# Patient Record
Sex: MALE | Race: WHITE
[De-identification: names, ages, dates, MRNs, and addresses within clinical notes are randomized per-mention and may not be internally consistent; named-entity substitution may affect disease eponyms.]

---

## 2020-04-01 ENCOUNTER — HOSPITAL ENCOUNTER (EMERGENCY)
Dept: HOSPITAL 26 - MED | Age: 35
Discharge: HOME | End: 2020-04-01
Payer: COMMERCIAL

## 2020-04-01 VITALS
SYSTOLIC BLOOD PRESSURE: 149 MMHG | DIASTOLIC BLOOD PRESSURE: 100 MMHG | BODY MASS INDEX: 27.31 KG/M2 | HEIGHT: 64 IN | WEIGHT: 160 LBS

## 2020-04-01 VITALS — SYSTOLIC BLOOD PRESSURE: 149 MMHG | DIASTOLIC BLOOD PRESSURE: 100 MMHG

## 2020-04-01 DIAGNOSIS — K21.9: ICD-10-CM

## 2020-04-01 DIAGNOSIS — Z20.828: ICD-10-CM

## 2020-04-01 DIAGNOSIS — J45.909: ICD-10-CM

## 2020-04-01 DIAGNOSIS — B34.9: Primary | ICD-10-CM

## 2020-04-01 DIAGNOSIS — Z79.899: ICD-10-CM

## 2020-04-01 PROCEDURE — 71045 X-RAY EXAM CHEST 1 VIEW: CPT

## 2020-04-01 PROCEDURE — 87804 INFLUENZA ASSAY W/OPTIC: CPT

## 2020-04-01 PROCEDURE — 99284 EMERGENCY DEPT VISIT MOD MDM: CPT

## 2020-04-01 PROCEDURE — 36415 COLL VENOUS BLD VENIPUNCTURE: CPT

## 2020-06-11 ENCOUNTER — HOSPITAL ENCOUNTER (EMERGENCY)
Dept: HOSPITAL 26 - MED | Age: 35
Discharge: HOME | End: 2020-06-11
Payer: COMMERCIAL

## 2020-06-11 VITALS — DIASTOLIC BLOOD PRESSURE: 88 MMHG | SYSTOLIC BLOOD PRESSURE: 138 MMHG

## 2020-06-11 VITALS — BODY MASS INDEX: 32.98 KG/M2 | WEIGHT: 168 LBS | HEIGHT: 60 IN

## 2020-06-11 VITALS — SYSTOLIC BLOOD PRESSURE: 146 MMHG | DIASTOLIC BLOOD PRESSURE: 96 MMHG

## 2020-06-11 DIAGNOSIS — R09.89: ICD-10-CM

## 2020-06-11 DIAGNOSIS — K21.9: ICD-10-CM

## 2020-06-11 DIAGNOSIS — Z20.828: ICD-10-CM

## 2020-06-11 DIAGNOSIS — R05: ICD-10-CM

## 2020-06-11 DIAGNOSIS — J45.909: ICD-10-CM

## 2020-06-11 DIAGNOSIS — Z79.899: ICD-10-CM

## 2020-06-11 DIAGNOSIS — R50.9: Primary | ICD-10-CM

## 2020-06-11 PROCEDURE — 99283 EMERGENCY DEPT VISIT LOW MDM: CPT

## 2020-06-11 NOTE — NUR
C/O SUBJECTIVE FEVER, COUGH, RINORRHEA, LOSS OF TASTE X 2 DAYS. TOOK DAY QUIL & 
NIGHT QUIL. TEMP 99.9, O2 SAT 97% AT THIS TIME.

LUNGS CLEAR BILTERALLY. RR EVEN AND UNLABORED.  IN TRIAGE.

PT ALERT AND AWAKE. PT AMBULATORY WITH STEADY GAIT.

PT STATES NO KNOWN COVID TESTING.

MED HX: ASTHMA, GERD

## 2020-06-11 NOTE — NUR
Patient discharged with v/s stable. Written and verbal after care instructions 
given and explained. 

Patient alert, oriented and verbalized understanding of instructions. 
Ambulatory with steady gait. All questions addressed prior to discharge. ID 
band removed. Patient advised to follow up with PMD. Rx of IBUPROFEN, 
ACETAMINOPHEN, AND PSEUDOEPHEDRINE HYDROCHLORIDE given. Patient educated on 
indication of medication including possible reaction and side effects. 
Opportunity to ask questions provided and answered.

PT INSTRUCTED THAT HE WILL GET A CALL IN UP TO 48 HOURS FOR COVID RESULT IF IT 
RETURNS POSITIVE

PT INSTRUCTED ON HAND HYGIENE AND SOCIAL DISTANCING

## 2023-07-15 ENCOUNTER — HOSPITAL ENCOUNTER (EMERGENCY)
Dept: HOSPITAL 26 - MED | Age: 38
Discharge: HOME | End: 2023-07-15
Payer: COMMERCIAL

## 2023-07-15 VITALS
SYSTOLIC BLOOD PRESSURE: 142 MMHG | HEART RATE: 90 BPM | TEMPERATURE: 98 F | RESPIRATION RATE: 14 BRPM | DIASTOLIC BLOOD PRESSURE: 88 MMHG | OXYGEN SATURATION: 98 %

## 2023-07-15 VITALS — BODY MASS INDEX: 28.32 KG/M2 | HEIGHT: 65 IN | WEIGHT: 170 LBS

## 2023-07-15 DIAGNOSIS — R42: Primary | ICD-10-CM

## 2023-07-15 DIAGNOSIS — R11.0: ICD-10-CM

## 2023-07-15 DIAGNOSIS — J45.909: ICD-10-CM

## 2023-07-15 DIAGNOSIS — H92.01: ICD-10-CM

## 2023-07-15 DIAGNOSIS — L40.9: ICD-10-CM

## 2023-07-15 DIAGNOSIS — Z79.899: ICD-10-CM

## 2023-07-15 DIAGNOSIS — K21.9: ICD-10-CM

## 2023-07-15 PROCEDURE — 93005 ELECTROCARDIOGRAM TRACING: CPT

## 2023-07-15 PROCEDURE — 99283 EMERGENCY DEPT VISIT LOW MDM: CPT

## 2023-07-15 NOTE — NUR
Mid level provider spoke with pt at length about dc and then gave order of dc. 
ACI given and reviewed with pt. Pt verbalized understanding and will follow up 
with primary. Pt is a/o x 4, vss, no ss of acute distress, breathing equal and 
unlabored, speech clear.

## 2023-08-27 ENCOUNTER — HOSPITAL ENCOUNTER (EMERGENCY)
Dept: HOSPITAL 26 - MED | Age: 38
Discharge: LEFT BEFORE DISCHARGE | End: 2023-08-27
Payer: COMMERCIAL

## 2023-08-27 VITALS — BODY MASS INDEX: 26.66 KG/M2 | HEIGHT: 65 IN | WEIGHT: 160 LBS

## 2023-08-27 VITALS
SYSTOLIC BLOOD PRESSURE: 131 MMHG | DIASTOLIC BLOOD PRESSURE: 94 MMHG | RESPIRATION RATE: 16 BRPM | HEART RATE: 72 BPM | TEMPERATURE: 97.6 F

## 2023-08-27 DIAGNOSIS — Z53.21: ICD-10-CM

## 2023-08-27 DIAGNOSIS — H53.8: Primary | ICD-10-CM
